# Patient Record
Sex: FEMALE | Race: WHITE | NOT HISPANIC OR LATINO
[De-identification: names, ages, dates, MRNs, and addresses within clinical notes are randomized per-mention and may not be internally consistent; named-entity substitution may affect disease eponyms.]

---

## 2017-04-28 ENCOUNTER — APPOINTMENT (OUTPATIENT)
Dept: MRI IMAGING | Facility: CLINIC | Age: 67
End: 2017-04-28

## 2017-04-28 ENCOUNTER — OUTPATIENT (OUTPATIENT)
Dept: OUTPATIENT SERVICES | Facility: HOSPITAL | Age: 67
LOS: 1 days | End: 2017-04-28
Payer: MEDICARE

## 2017-04-28 DIAGNOSIS — Z00.8 ENCOUNTER FOR OTHER GENERAL EXAMINATION: ICD-10-CM

## 2017-04-28 PROCEDURE — C8908: CPT

## 2017-04-28 PROCEDURE — C8937: CPT

## 2017-04-28 PROCEDURE — A9585: CPT

## 2017-10-30 ENCOUNTER — APPOINTMENT (OUTPATIENT)
Dept: MAMMOGRAPHY | Facility: CLINIC | Age: 67
End: 2017-10-30
Payer: MEDICARE

## 2017-10-30 ENCOUNTER — APPOINTMENT (OUTPATIENT)
Dept: ULTRASOUND IMAGING | Facility: CLINIC | Age: 67
End: 2017-10-30
Payer: MEDICARE

## 2017-10-30 ENCOUNTER — OUTPATIENT (OUTPATIENT)
Dept: OUTPATIENT SERVICES | Facility: HOSPITAL | Age: 67
LOS: 1 days | End: 2017-10-30
Payer: MEDICARE

## 2017-10-30 DIAGNOSIS — Z00.8 ENCOUNTER FOR OTHER GENERAL EXAMINATION: ICD-10-CM

## 2017-10-30 PROCEDURE — G0202: CPT | Mod: 26

## 2017-10-30 PROCEDURE — 77063 BREAST TOMOSYNTHESIS BI: CPT | Mod: 26

## 2017-10-30 PROCEDURE — 76641 ULTRASOUND BREAST COMPLETE: CPT | Mod: 26,50

## 2017-10-30 PROCEDURE — 76641 ULTRASOUND BREAST COMPLETE: CPT

## 2017-10-30 PROCEDURE — 77063 BREAST TOMOSYNTHESIS BI: CPT

## 2017-10-30 PROCEDURE — 77067 SCR MAMMO BI INCL CAD: CPT

## 2017-11-05 ENCOUNTER — TRANSCRIPTION ENCOUNTER (OUTPATIENT)
Age: 67
End: 2017-11-05

## 2017-11-08 ENCOUNTER — TRANSCRIPTION ENCOUNTER (OUTPATIENT)
Age: 67
End: 2017-11-08

## 2017-11-20 ENCOUNTER — APPOINTMENT (OUTPATIENT)
Dept: ORTHOPEDIC SURGERY | Facility: CLINIC | Age: 67
End: 2017-11-20
Payer: MEDICARE

## 2017-11-20 VITALS
DIASTOLIC BLOOD PRESSURE: 78 MMHG | HEIGHT: 65 IN | WEIGHT: 213 LBS | SYSTOLIC BLOOD PRESSURE: 130 MMHG | HEART RATE: 55 BPM | TEMPERATURE: 98.1 F | BODY MASS INDEX: 35.49 KG/M2

## 2017-11-20 DIAGNOSIS — Z78.9 OTHER SPECIFIED HEALTH STATUS: ICD-10-CM

## 2017-11-20 DIAGNOSIS — Z87.39 PERSONAL HISTORY OF OTHER DISEASES OF THE MUSCULOSKELETAL SYSTEM AND CONNECTIVE TISSUE: ICD-10-CM

## 2017-11-20 DIAGNOSIS — Z82.61 FAMILY HISTORY OF ARTHRITIS: ICD-10-CM

## 2017-11-20 PROCEDURE — 99204 OFFICE O/P NEW MOD 45 MIN: CPT

## 2017-11-20 RX ORDER — ECONAZOLE NITRATE 10 MG/G
1 CREAM TOPICAL
Qty: 85 | Refills: 0 | Status: ACTIVE | COMMUNITY
Start: 2017-04-19

## 2017-11-20 RX ORDER — METOPROLOL TARTRATE 100 MG/1
100 TABLET, FILM COATED ORAL
Qty: 180 | Refills: 0 | Status: ACTIVE | COMMUNITY
Start: 2017-04-27

## 2017-11-20 RX ORDER — DILTIAZEM HYDROCHLORIDE 120 MG/1
120 CAPSULE, EXTENDED RELEASE ORAL
Qty: 90 | Refills: 0 | Status: ACTIVE | COMMUNITY
Start: 2017-08-11

## 2017-11-20 RX ORDER — ROSUVASTATIN CALCIUM 5 MG/1
5 TABLET, FILM COATED ORAL
Qty: 45 | Refills: 0 | Status: ACTIVE | COMMUNITY
Start: 2017-03-20

## 2017-11-29 ENCOUNTER — TRANSCRIPTION ENCOUNTER (OUTPATIENT)
Age: 67
End: 2017-11-29

## 2018-05-21 ENCOUNTER — APPOINTMENT (OUTPATIENT)
Dept: MRI IMAGING | Facility: CLINIC | Age: 68
End: 2018-05-21
Payer: MEDICARE

## 2018-05-21 ENCOUNTER — OUTPATIENT (OUTPATIENT)
Dept: OUTPATIENT SERVICES | Facility: HOSPITAL | Age: 68
LOS: 1 days | End: 2018-05-21
Payer: MEDICARE

## 2018-05-21 DIAGNOSIS — Z00.8 ENCOUNTER FOR OTHER GENERAL EXAMINATION: ICD-10-CM

## 2018-05-21 PROCEDURE — 82565 ASSAY OF CREATININE: CPT

## 2018-05-21 PROCEDURE — C8937: CPT

## 2018-05-21 PROCEDURE — A9585: CPT

## 2018-05-21 PROCEDURE — C8908: CPT

## 2018-05-21 PROCEDURE — 77059 MRI BREAST BILATERAL: CPT | Mod: 26

## 2018-05-21 PROCEDURE — 0159T: CPT | Mod: 26

## 2018-06-11 ENCOUNTER — RESULT REVIEW (OUTPATIENT)
Age: 68
End: 2018-06-11

## 2018-10-15 ENCOUNTER — TRANSCRIPTION ENCOUNTER (OUTPATIENT)
Age: 68
End: 2018-10-15

## 2018-10-15 NOTE — ASU PATIENT PROFILE, ADULT - PMH
Atrial fibrillation    HLD (hyperlipidemia)    HTN (hypertension)    Hypothyroid    Prediabetes    Thyroid cancer

## 2018-10-16 ENCOUNTER — OUTPATIENT (OUTPATIENT)
Dept: OUTPATIENT SERVICES | Facility: HOSPITAL | Age: 68
LOS: 1 days | End: 2018-10-16
Payer: MEDICARE

## 2018-10-16 VITALS
TEMPERATURE: 98 F | SYSTOLIC BLOOD PRESSURE: 126 MMHG | HEART RATE: 53 BPM | OXYGEN SATURATION: 99 % | RESPIRATION RATE: 17 BRPM | DIASTOLIC BLOOD PRESSURE: 70 MMHG | HEIGHT: 65 IN | WEIGHT: 212.97 LBS

## 2018-10-16 VITALS
HEART RATE: 55 BPM | DIASTOLIC BLOOD PRESSURE: 80 MMHG | RESPIRATION RATE: 15 BRPM | OXYGEN SATURATION: 100 % | SYSTOLIC BLOOD PRESSURE: 135 MMHG

## 2018-10-16 DIAGNOSIS — H25.21 AGE-RELATED CATARACT, MORGAGNIAN TYPE, RIGHT EYE: ICD-10-CM

## 2018-10-16 DIAGNOSIS — Z98.890 OTHER SPECIFIED POSTPROCEDURAL STATES: Chronic | ICD-10-CM

## 2018-10-16 PROCEDURE — 66984 XCAPSL CTRC RMVL W/O ECP: CPT | Mod: RT

## 2018-10-16 PROCEDURE — V2632: CPT

## 2018-11-01 ENCOUNTER — APPOINTMENT (OUTPATIENT)
Dept: MAMMOGRAPHY | Facility: CLINIC | Age: 68
End: 2018-11-01
Payer: MEDICARE

## 2018-11-01 ENCOUNTER — APPOINTMENT (OUTPATIENT)
Dept: ULTRASOUND IMAGING | Facility: CLINIC | Age: 68
End: 2018-11-01
Payer: MEDICARE

## 2018-11-01 ENCOUNTER — OUTPATIENT (OUTPATIENT)
Dept: OUTPATIENT SERVICES | Facility: HOSPITAL | Age: 68
LOS: 1 days | End: 2018-11-01
Payer: MEDICARE

## 2018-11-01 DIAGNOSIS — Z12.31 ENCOUNTER FOR SCREENING MAMMOGRAM FOR MALIGNANT NEOPLASM OF BREAST: ICD-10-CM

## 2018-11-01 DIAGNOSIS — Z98.890 OTHER SPECIFIED POSTPROCEDURAL STATES: Chronic | ICD-10-CM

## 2018-11-01 PROBLEM — E03.9 HYPOTHYROIDISM, UNSPECIFIED: Chronic | Status: ACTIVE | Noted: 2018-10-16

## 2018-11-01 PROBLEM — C73 MALIGNANT NEOPLASM OF THYROID GLAND: Chronic | Status: ACTIVE | Noted: 2018-10-16

## 2018-11-01 PROBLEM — I48.91 UNSPECIFIED ATRIAL FIBRILLATION: Chronic | Status: ACTIVE | Noted: 2018-10-16

## 2018-11-01 PROBLEM — E78.5 HYPERLIPIDEMIA, UNSPECIFIED: Chronic | Status: ACTIVE | Noted: 2018-10-16

## 2018-11-01 PROBLEM — I10 ESSENTIAL (PRIMARY) HYPERTENSION: Chronic | Status: ACTIVE | Noted: 2018-10-16

## 2018-11-01 PROBLEM — R73.03 PREDIABETES: Chronic | Status: ACTIVE | Noted: 2018-10-16

## 2018-11-01 PROCEDURE — 77063 BREAST TOMOSYNTHESIS BI: CPT | Mod: 26

## 2018-11-01 PROCEDURE — 77063 BREAST TOMOSYNTHESIS BI: CPT

## 2018-11-01 PROCEDURE — 76641 ULTRASOUND BREAST COMPLETE: CPT | Mod: 26,50

## 2018-11-01 PROCEDURE — 77067 SCR MAMMO BI INCL CAD: CPT

## 2018-11-01 PROCEDURE — 77067 SCR MAMMO BI INCL CAD: CPT | Mod: 26

## 2018-11-01 PROCEDURE — 76641 ULTRASOUND BREAST COMPLETE: CPT

## 2018-11-05 ENCOUNTER — OUTPATIENT (OUTPATIENT)
Dept: OUTPATIENT SERVICES | Facility: HOSPITAL | Age: 68
LOS: 1 days | End: 2018-11-05
Payer: MEDICARE

## 2018-11-05 ENCOUNTER — APPOINTMENT (OUTPATIENT)
Dept: MAMMOGRAPHY | Facility: CLINIC | Age: 68
End: 2018-11-05
Payer: MEDICARE

## 2018-11-05 DIAGNOSIS — Z98.890 OTHER SPECIFIED POSTPROCEDURAL STATES: Chronic | ICD-10-CM

## 2018-11-05 DIAGNOSIS — Z91.89 OTHER SPECIFIED PERSONAL RISK FACTORS, NOT ELSEWHERE CLASSIFIED: ICD-10-CM

## 2018-11-05 PROCEDURE — 77065 DX MAMMO INCL CAD UNI: CPT | Mod: 26,RT

## 2018-11-05 PROCEDURE — 77065 DX MAMMO INCL CAD UNI: CPT

## 2018-11-05 PROCEDURE — G0279: CPT | Mod: 26

## 2018-11-05 PROCEDURE — G0279: CPT

## 2018-11-12 ENCOUNTER — TRANSCRIPTION ENCOUNTER (OUTPATIENT)
Age: 68
End: 2018-11-12

## 2018-11-13 ENCOUNTER — OUTPATIENT (OUTPATIENT)
Dept: OUTPATIENT SERVICES | Facility: HOSPITAL | Age: 68
LOS: 1 days | End: 2018-11-13
Payer: MEDICARE

## 2018-11-13 VITALS
DIASTOLIC BLOOD PRESSURE: 61 MMHG | SYSTOLIC BLOOD PRESSURE: 117 MMHG | TEMPERATURE: 98 F | WEIGHT: 211.2 LBS | OXYGEN SATURATION: 98 % | HEIGHT: 64 IN | HEART RATE: 53 BPM | RESPIRATION RATE: 9 BRPM

## 2018-11-13 VITALS
HEART RATE: 58 BPM | SYSTOLIC BLOOD PRESSURE: 117 MMHG | DIASTOLIC BLOOD PRESSURE: 69 MMHG | OXYGEN SATURATION: 99 % | RESPIRATION RATE: 15 BRPM

## 2018-11-13 DIAGNOSIS — H25.22 AGE-RELATED CATARACT, MORGAGNIAN TYPE, LEFT EYE: ICD-10-CM

## 2018-11-13 DIAGNOSIS — Z98.41 CATARACT EXTRACTION STATUS, RIGHT EYE: Chronic | ICD-10-CM

## 2018-11-13 DIAGNOSIS — Z98.890 OTHER SPECIFIED POSTPROCEDURAL STATES: Chronic | ICD-10-CM

## 2018-11-13 PROCEDURE — V2632: CPT

## 2018-11-13 PROCEDURE — 66984 XCAPSL CTRC RMVL W/O ECP: CPT | Mod: LT

## 2018-11-28 ENCOUNTER — RESULT REVIEW (OUTPATIENT)
Age: 68
End: 2018-11-28

## 2018-11-28 ENCOUNTER — APPOINTMENT (OUTPATIENT)
Dept: MAMMOGRAPHY | Facility: CLINIC | Age: 68
End: 2018-11-28
Payer: MEDICARE

## 2018-11-28 ENCOUNTER — OUTPATIENT (OUTPATIENT)
Dept: OUTPATIENT SERVICES | Facility: HOSPITAL | Age: 68
LOS: 1 days | End: 2018-11-28
Payer: MEDICARE

## 2018-11-28 DIAGNOSIS — Z98.41 CATARACT EXTRACTION STATUS, RIGHT EYE: Chronic | ICD-10-CM

## 2018-11-28 DIAGNOSIS — Z91.89 OTHER SPECIFIED PERSONAL RISK FACTORS, NOT ELSEWHERE CLASSIFIED: ICD-10-CM

## 2018-11-28 DIAGNOSIS — Z98.890 OTHER SPECIFIED POSTPROCEDURAL STATES: Chronic | ICD-10-CM

## 2018-11-28 PROCEDURE — A4648: CPT

## 2018-11-28 PROCEDURE — 77065 DX MAMMO INCL CAD UNI: CPT | Mod: 26,RT

## 2018-11-28 PROCEDURE — 19081 BX BREAST 1ST LESION STRTCTC: CPT | Mod: RT

## 2018-11-28 PROCEDURE — 77065 DX MAMMO INCL CAD UNI: CPT

## 2018-11-28 PROCEDURE — 19081 BX BREAST 1ST LESION STRTCTC: CPT

## 2019-10-03 ENCOUNTER — APPOINTMENT (OUTPATIENT)
Dept: ULTRASOUND IMAGING | Facility: CLINIC | Age: 69
End: 2019-10-03
Payer: MEDICARE

## 2019-10-03 ENCOUNTER — OUTPATIENT (OUTPATIENT)
Dept: OUTPATIENT SERVICES | Facility: HOSPITAL | Age: 69
LOS: 1 days | End: 2019-10-03
Payer: MEDICARE

## 2019-10-03 ENCOUNTER — APPOINTMENT (OUTPATIENT)
Dept: MAMMOGRAPHY | Facility: CLINIC | Age: 69
End: 2019-10-03
Payer: MEDICARE

## 2019-10-03 DIAGNOSIS — Z98.41 CATARACT EXTRACTION STATUS, RIGHT EYE: Chronic | ICD-10-CM

## 2019-10-03 DIAGNOSIS — Z98.890 OTHER SPECIFIED POSTPROCEDURAL STATES: Chronic | ICD-10-CM

## 2019-10-03 DIAGNOSIS — Z00.8 ENCOUNTER FOR OTHER GENERAL EXAMINATION: ICD-10-CM

## 2019-10-03 PROCEDURE — G0279: CPT | Mod: 26

## 2019-10-03 PROCEDURE — G0279: CPT

## 2019-10-03 PROCEDURE — 76641 ULTRASOUND BREAST COMPLETE: CPT | Mod: 26,50

## 2019-10-03 PROCEDURE — 77066 DX MAMMO INCL CAD BI: CPT | Mod: 26

## 2019-10-03 PROCEDURE — 76641 ULTRASOUND BREAST COMPLETE: CPT

## 2019-10-03 PROCEDURE — 77066 DX MAMMO INCL CAD BI: CPT

## 2020-04-02 ENCOUNTER — APPOINTMENT (OUTPATIENT)
Dept: MRI IMAGING | Facility: CLINIC | Age: 70
End: 2020-04-02

## 2020-08-26 ENCOUNTER — OUTPATIENT (OUTPATIENT)
Dept: OUTPATIENT SERVICES | Facility: HOSPITAL | Age: 70
LOS: 1 days | End: 2020-08-26
Payer: MEDICARE

## 2020-08-26 ENCOUNTER — APPOINTMENT (OUTPATIENT)
Dept: MRI IMAGING | Facility: CLINIC | Age: 70
End: 2020-08-26
Payer: MEDICARE

## 2020-08-26 DIAGNOSIS — D05.11 INTRADUCTAL CARCINOMA IN SITU OF RIGHT BREAST: ICD-10-CM

## 2020-08-26 DIAGNOSIS — Z98.41 CATARACT EXTRACTION STATUS, RIGHT EYE: Chronic | ICD-10-CM

## 2020-08-26 DIAGNOSIS — Z98.890 OTHER SPECIFIED POSTPROCEDURAL STATES: Chronic | ICD-10-CM

## 2020-08-26 PROCEDURE — A9585: CPT

## 2020-08-26 PROCEDURE — C8908: CPT

## 2020-08-26 PROCEDURE — C8937: CPT

## 2020-08-26 PROCEDURE — 77049 MRI BREAST C-+ W/CAD BI: CPT | Mod: 26

## 2020-10-05 ENCOUNTER — APPOINTMENT (OUTPATIENT)
Dept: MAMMOGRAPHY | Facility: CLINIC | Age: 70
End: 2020-10-05
Payer: MEDICARE

## 2020-10-05 ENCOUNTER — OUTPATIENT (OUTPATIENT)
Dept: OUTPATIENT SERVICES | Facility: HOSPITAL | Age: 70
LOS: 1 days | End: 2020-10-05
Payer: MEDICARE

## 2020-10-05 ENCOUNTER — APPOINTMENT (OUTPATIENT)
Dept: ULTRASOUND IMAGING | Facility: CLINIC | Age: 70
End: 2020-10-05
Payer: MEDICARE

## 2020-10-05 DIAGNOSIS — Z98.890 OTHER SPECIFIED POSTPROCEDURAL STATES: Chronic | ICD-10-CM

## 2020-10-05 DIAGNOSIS — Z00.8 ENCOUNTER FOR OTHER GENERAL EXAMINATION: ICD-10-CM

## 2020-10-05 DIAGNOSIS — D05.11 INTRADUCTAL CARCINOMA IN SITU OF RIGHT BREAST: ICD-10-CM

## 2020-10-05 DIAGNOSIS — Z98.41 CATARACT EXTRACTION STATUS, RIGHT EYE: Chronic | ICD-10-CM

## 2020-10-05 PROCEDURE — 76641 ULTRASOUND BREAST COMPLETE: CPT

## 2020-10-05 PROCEDURE — G0279: CPT | Mod: 26

## 2020-10-05 PROCEDURE — 76641 ULTRASOUND BREAST COMPLETE: CPT | Mod: 26,50

## 2020-10-05 PROCEDURE — G0279: CPT

## 2020-10-05 PROCEDURE — 77066 DX MAMMO INCL CAD BI: CPT | Mod: 26

## 2020-10-05 PROCEDURE — 77066 DX MAMMO INCL CAD BI: CPT

## 2021-04-14 ENCOUNTER — APPOINTMENT (OUTPATIENT)
Dept: MAMMOGRAPHY | Facility: CLINIC | Age: 71
End: 2021-04-14

## 2021-04-14 ENCOUNTER — APPOINTMENT (OUTPATIENT)
Dept: ULTRASOUND IMAGING | Facility: CLINIC | Age: 71
End: 2021-04-14

## 2021-05-11 DIAGNOSIS — Z92.3 PERSONAL HISTORY OF IRRADIATION: ICD-10-CM

## 2021-05-11 DIAGNOSIS — C73 MALIGNANT NEOPLASM OF THYROID GLAND: ICD-10-CM

## 2021-05-11 DIAGNOSIS — E78.5 HYPERLIPIDEMIA, UNSPECIFIED: ICD-10-CM

## 2021-05-11 DIAGNOSIS — S60.051A CONTUSION OF RIGHT LITTLE FINGER W/OUT DAMAGE TO NAIL, INITIAL ENCOUNTER: ICD-10-CM

## 2021-05-11 DIAGNOSIS — Z13.79 ENCOUNTER FOR OTHER SCREENING FOR GENETIC AND CHROMOSOMAL ANOMALIES: ICD-10-CM

## 2021-05-11 DIAGNOSIS — I10 ESSENTIAL (PRIMARY) HYPERTENSION: ICD-10-CM

## 2021-05-11 DIAGNOSIS — Z80.3 FAMILY HISTORY OF MALIGNANT NEOPLASM OF BREAST: ICD-10-CM

## 2021-05-20 ENCOUNTER — RESULT REVIEW (OUTPATIENT)
Age: 71
End: 2021-05-20

## 2021-05-26 ENCOUNTER — OUTPATIENT (OUTPATIENT)
Dept: OUTPATIENT SERVICES | Facility: HOSPITAL | Age: 71
LOS: 1 days | Discharge: HOME | End: 2021-05-26

## 2021-05-26 ENCOUNTER — APPOINTMENT (OUTPATIENT)
Dept: HEMATOLOGY ONCOLOGY | Facility: CLINIC | Age: 71
End: 2021-05-26
Payer: MEDICARE

## 2021-05-26 VITALS
DIASTOLIC BLOOD PRESSURE: 78 MMHG | SYSTOLIC BLOOD PRESSURE: 147 MMHG | WEIGHT: 210 LBS | TEMPERATURE: 98.7 F | HEART RATE: 67 BPM | BODY MASS INDEX: 34.95 KG/M2

## 2021-05-26 DIAGNOSIS — Z98.41 CATARACT EXTRACTION STATUS, RIGHT EYE: Chronic | ICD-10-CM

## 2021-05-26 DIAGNOSIS — Z98.890 OTHER SPECIFIED POSTPROCEDURAL STATES: Chronic | ICD-10-CM

## 2021-05-26 PROCEDURE — 99204 OFFICE O/P NEW MOD 45 MIN: CPT

## 2021-05-26 NOTE — HISTORY OF PRESENT ILLNESS
[Disease: _____________________] : Disease: [unfilled] [T: ___] : T[unfilled] [AJCC Stage: ____] : AJCC Stage: [unfilled] [de-identified] : 70 y/o woman with hx of DCIS \par Nov 2018 new  calcifications in R breast on screening mammogram. Bx : DCIS intermediate grade ER > 95% AL 80 % \par \par 1/24/19 right lumpectomy and b/l breast reduction ( Dr Elmore at [place]) : multifocal DCIS ER positive . Adjuvant RT ( [facility]). On anastrozole since  May  2019.\par Wants to transfer care here. \par \par feels well. \par Tolerating anastrozole OK.\par \par Yearly breast  exam with Dr Elmore ( ~ NOvember) Monitored with imaging q 6 months ( mammo/  MRI , now mammogram / sono q 6 months) - last imaging Mammogram April 2021 OK .\par \par GYN yearly - Dr Gallardo.\par Bone density 2020- told normal/ mild osteopenia. On vit D 5000 units daily  \par  [de-identified] : Genetic testing Radha 2019- panel negative

## 2021-05-26 NOTE — PHYSICAL EXAM
[Normal] : no JVD, no calf tenderness, venous stasis changes, varices [de-identified] : looks well  [de-identified] : s/p R breats lumpectomy and b/l breats reduction, no suspicious masses , no axillary adenopathy

## 2021-05-26 NOTE — REASON FOR VISIT
[Initial Consultation] : an initial consultation [FreeTextEntry2] : DCIS on Arimidex , transfer of care from [facility]

## 2021-05-26 NOTE — CONSULT LETTER
[Dear  ___] : Dear  [unfilled], [( Thank you for referring [unfilled] for consultation for _____ )] : Thank you for referring [unfilled] for consultation for [unfilled] [Please see my note below.] : Please see my note below. [Consult Closing:] : Thank you very much for allowing me to participate in the care of this patient.  If you have any questions, please do not hesitate to contact me. [Sincerely,] : Sincerely, [FreeTextEntry3] : Jil Mosqueda  [FreeTextEntry2] : Dr Flaco Elmore

## 2021-05-26 NOTE — ASSESSMENT
[FreeTextEntry1] : 72 y/o female wih hx of R breast  multifocal DCIS ER positive diagnosed in 2019 s/p R breast lumpectomy ( Dr Elmore) adjuvant RT ( [facility]) , on anastrozole since May 2019 . Tolerating well. SHIKHA clinically.\par \par Continue anastrozole x 5 years ( until May 2024)\par Breast exam and surveillance imaging per  breast surgery.\par Bone density in 2022 9 Dr Gallardo)\par Cont Vit D 5000 units daily . Follow up labs will be done by PCP .\par return visit in one year/ sooner PRN.\par \par D/w patient , answered multiple questions.

## 2021-06-03 DIAGNOSIS — C50.919 MALIGNANT NEOPLASM OF UNSPECIFIED SITE OF UNSPECIFIED FEMALE BREAST: ICD-10-CM

## 2021-06-08 DIAGNOSIS — Z86.000 PERSONAL HISTORY OF IN-SITU NEOPLASM OF BREAST: ICD-10-CM

## 2021-07-30 ENCOUNTER — TRANSCRIPTION ENCOUNTER (OUTPATIENT)
Age: 71
End: 2021-07-30

## 2022-05-08 ENCOUNTER — OUTPATIENT (OUTPATIENT)
Dept: OUTPATIENT SERVICES | Facility: HOSPITAL | Age: 72
LOS: 1 days | Discharge: HOME | End: 2022-05-08

## 2022-05-08 DIAGNOSIS — C50.919 MALIGNANT NEOPLASM OF UNSPECIFIED SITE OF UNSPECIFIED FEMALE BREAST: ICD-10-CM

## 2022-05-08 DIAGNOSIS — Z98.890 OTHER SPECIFIED POSTPROCEDURAL STATES: Chronic | ICD-10-CM

## 2022-05-08 DIAGNOSIS — Z98.41 CATARACT EXTRACTION STATUS, RIGHT EYE: Chronic | ICD-10-CM

## 2022-05-11 ENCOUNTER — APPOINTMENT (OUTPATIENT)
Dept: HEMATOLOGY ONCOLOGY | Facility: CLINIC | Age: 72
End: 2022-05-11
Payer: MEDICARE

## 2022-05-11 ENCOUNTER — APPOINTMENT (OUTPATIENT)
Dept: HEMATOLOGY ONCOLOGY | Facility: CLINIC | Age: 72
End: 2022-05-11

## 2022-05-11 VITALS
OXYGEN SATURATION: 96 % | BODY MASS INDEX: 34.28 KG/M2 | SYSTOLIC BLOOD PRESSURE: 134 MMHG | TEMPERATURE: 99 F | WEIGHT: 206 LBS | DIASTOLIC BLOOD PRESSURE: 80 MMHG | HEART RATE: 71 BPM | RESPIRATION RATE: 17 BRPM

## 2022-05-11 PROCEDURE — 99214 OFFICE O/P EST MOD 30 MIN: CPT

## 2022-06-20 NOTE — PHYSICAL EXAM
[Normal] : no JVD, no calf tenderness, venous stasis changes, varices [de-identified] : looks well  [de-identified] : s/p R breast  lumpectomy and b/l breast  reduction, no suspicious masses , no axillary adenopathy

## 2022-06-20 NOTE — REVIEW OF SYSTEMS
no dysuria, no frequency, and no hematuria. [Patient Intake Form Reviewed] : Patient intake form was reviewed [Negative] : Allergic/Immunologic [Joint Pain] : joint pain [Joint Stiffness] : joint stiffness [FreeTextEntry9] : chronic

## 2022-06-20 NOTE — HISTORY OF PRESENT ILLNESS
[Disease: _____________________] : Disease: [unfilled] [T: ___] : T[unfilled] [AJCC Stage: ____] : AJCC Stage: [unfilled] [de-identified] : 72 y/o woman with hx of DCIS \par Nov 2018 new  calcifications in R breast on screening mammogram. Bx : DCIS intermediate grade ER > 95% AR 80 % \par \par 1/24/19 right lumpectomy and b/l breast reduction ( Dr Elmore at [place]) : multifocal DCIS ER positive . Adjuvant RT ( [facility]). On anastrozole since  May  2019.\par Wants to transfer care here. \par \par feels well. \par Tolerating anastrozole OK.\par \par Yearly breast  exam with Dr Emlore ( ~ NOvember) Monitored with imaging q 6 months ( mammo/  MRI , now mammogram / sono q 6 months) - last imaging Mammogram April 2021 OK .\par \par GYN yearly - Dr Gallardo.\par Bone density 2020- told normal/ mild osteopenia. On vit D 5000 units daily  \par  [de-identified] : Genetic testing Radha 2019- panel negative  [de-identified] : Since her last visit no new issues.\par Follows with breast surgery Dr Nidia MELARA

## 2022-06-20 NOTE — ASSESSMENT
[FreeTextEntry1] : 71 y/o female wih hx of R breast  multifocal DCIS ER positive diagnosed in 2019 s/p R breast lumpectomy ( Dr Elmore) adjuvant RT ( [facility]) , on anastrozole since May 2019 . Tolerating well. SHIKHA clinically.\par \par Continue anastrozole x 5 years ( until May 2024)\par Breast exam and surveillance imaging per  breast surgery.\par Bone density in 2022 due ( Dr Gallardo)\par Cont Vit D 5000 units daily . Follow up labs will be done by PCP .\par return visit in one year/ sooner PRN.\par \par D/w patient.

## 2022-08-26 ENCOUNTER — NON-APPOINTMENT (OUTPATIENT)
Age: 72
End: 2022-08-26

## 2022-08-26 DIAGNOSIS — Z92.89 PERSONAL HISTORY OF OTHER MEDICAL TREATMENT: ICD-10-CM

## 2022-08-26 DIAGNOSIS — Z85.3 PERSONAL HISTORY OF MALIGNANT NEOPLASM OF BREAST: ICD-10-CM

## 2022-08-26 DIAGNOSIS — Z87.19 PERSONAL HISTORY OF OTHER DISEASES OF THE DIGESTIVE SYSTEM: ICD-10-CM

## 2022-08-26 DIAGNOSIS — Z98.890 OTHER SPECIFIED POSTPROCEDURAL STATES: ICD-10-CM

## 2022-08-26 DIAGNOSIS — Z78.9 OTHER SPECIFIED HEALTH STATUS: ICD-10-CM

## 2022-08-26 DIAGNOSIS — N81.2 INCOMPLETE UTEROVAGINAL PROLAPSE: ICD-10-CM

## 2022-09-16 ENCOUNTER — APPOINTMENT (OUTPATIENT)
Dept: OBGYN | Facility: CLINIC | Age: 72
End: 2022-09-16

## 2022-09-16 ENCOUNTER — RESULT CHARGE (OUTPATIENT)
Age: 72
End: 2022-09-16

## 2022-09-16 VITALS
SYSTOLIC BLOOD PRESSURE: 146 MMHG | BODY MASS INDEX: 33.32 KG/M2 | DIASTOLIC BLOOD PRESSURE: 82 MMHG | WEIGHT: 200 LBS | HEIGHT: 65 IN

## 2022-09-16 PROCEDURE — 81003 URINALYSIS AUTO W/O SCOPE: CPT | Mod: QW

## 2022-09-16 PROCEDURE — 82270 OCCULT BLOOD FECES: CPT

## 2022-09-16 PROCEDURE — G0101: CPT

## 2022-09-16 PROCEDURE — 85018 HEMOGLOBIN: CPT | Mod: QW

## 2022-09-16 NOTE — HISTORY OF PRESENT ILLNESS
[Patient reported colonoscopy was normal] : Patient reported colonoscopy was normal [TextBox_4] : Patient is a 73y/o female here today for annual visit. Hx of stage 3 uterovaginal prolapse. Stable at this time.   Patient hx of DCIS followed by breast surgeon at [facility]. On anastrazole. Patient has no complaints at this time.  [TextBox_19] : followed by breast surgeon [TextBox_37] : due now [ColonoscopyDate] : 11/18 [TextBox_43] : due 11/22

## 2022-09-16 NOTE — PLAN
[FreeTextEntry1] : Patient to follow up in 1 year for annual GYN exam\par Mammogram and bilateral breast US : Per breast surgeon\par Colonoscopy due: 11/22\par Bone density due: now Rx given\par Pap ordered\par Hemoccult ordered \par All questions answered, patient agreeable with plan.\par \par Written by Valorie GREEN, acting as a scribe for Dr. Gallardo. This note accurately reflects the work and decisions made by me.\par

## 2022-09-16 NOTE — PHYSICAL EXAM
[Chaperone Present] : A chaperone was present in the examining room during all aspects of the physical examination [Appropriately responsive] : appropriately responsive [No Lymphadenopathy] : no lymphadenopathy [Soft] : soft [Non-tender] : non-tender [Oriented x3] : oriented x3 [Examination Of The Breasts] : a normal appearance [No Discharge] : no discharge [No Masses] : no breast masses were palpable [Labia Majora] : normal [Labia Minora] : normal [Atrophy] : atrophy [Normal] : normal [Uterine Adnexae] : normal [FreeTextEntry1] : Valorie WOO-CHARLES chaperoned during entire physical exam [Occult Blood Positive] : was negative for occult blood analysis [FreeTextEntry4] : stage 3 uterovaginal prolapse  [FreeTextEntry5] : prolapse

## 2022-09-20 LAB
BILIRUB UR QL STRIP: NEGATIVE
GLUCOSE UR-MCNC: NEGATIVE
HCG UR QL: 0.2 EU/DL
HEMOGLOBIN: 14.3
HGB UR QL STRIP.AUTO: NORMAL
KETONES UR-MCNC: NEGATIVE
LEUKOCYTE ESTERASE UR QL STRIP: NORMAL
NITRITE UR QL STRIP: NEGATIVE
PH UR STRIP: 5.5
PROT UR STRIP-MCNC: NEGATIVE
SP GR UR STRIP: 1.02

## 2022-09-27 LAB — CYTOLOGY CVX/VAG DOC THIN PREP: NORMAL

## 2022-10-06 ENCOUNTER — OUTPATIENT (OUTPATIENT)
Dept: OUTPATIENT SERVICES | Facility: HOSPITAL | Age: 72
LOS: 1 days | End: 2022-10-06
Payer: MEDICARE

## 2022-10-06 ENCOUNTER — APPOINTMENT (OUTPATIENT)
Dept: RADIOLOGY | Facility: CLINIC | Age: 72
End: 2022-10-06

## 2022-10-06 DIAGNOSIS — Z98.890 OTHER SPECIFIED POSTPROCEDURAL STATES: Chronic | ICD-10-CM

## 2022-10-06 DIAGNOSIS — M85.80 OTHER SPECIFIED DISORDERS OF BONE DENSITY AND STRUCTURE, UNSPECIFIED SITE: ICD-10-CM

## 2022-10-06 DIAGNOSIS — Z98.41 CATARACT EXTRACTION STATUS, RIGHT EYE: Chronic | ICD-10-CM

## 2022-10-06 PROCEDURE — 77080 DXA BONE DENSITY AXIAL: CPT | Mod: 26

## 2022-10-06 PROCEDURE — 77080 DXA BONE DENSITY AXIAL: CPT

## 2022-10-21 ENCOUNTER — NON-APPOINTMENT (OUTPATIENT)
Age: 72
End: 2022-10-21

## 2023-01-16 NOTE — ASU PREOP CHECKLIST - HOW ADMINISTERED

## 2023-05-16 ENCOUNTER — TRANSCRIPTION ENCOUNTER (OUTPATIENT)
Age: 73
End: 2023-05-16

## 2023-05-16 ENCOUNTER — OUTPATIENT (OUTPATIENT)
Dept: OUTPATIENT SERVICES | Facility: HOSPITAL | Age: 73
LOS: 1 days | Discharge: HOME | End: 2023-05-16

## 2023-05-16 DIAGNOSIS — Z98.890 OTHER SPECIFIED POSTPROCEDURAL STATES: Chronic | ICD-10-CM

## 2023-05-16 DIAGNOSIS — C50.919 MALIGNANT NEOPLASM OF UNSPECIFIED SITE OF UNSPECIFIED FEMALE BREAST: ICD-10-CM

## 2023-05-16 DIAGNOSIS — Z98.41 CATARACT EXTRACTION STATUS, RIGHT EYE: Chronic | ICD-10-CM

## 2023-05-23 PROBLEM — Z86.79 HISTORY OF ATRIAL FIBRILLATION: Status: RESOLVED | Noted: 2021-05-11 | Resolved: 2023-05-23

## 2023-05-23 PROBLEM — Z86.79 HISTORY OF ATRIAL FIBRILLATION: Status: RESOLVED | Noted: 2022-08-26 | Resolved: 2023-05-23

## 2023-05-23 RX ORDER — LEVOTHYROXINE SODIUM 137 UG/1
TABLET ORAL
Refills: 0 | Status: DISCONTINUED | COMMUNITY
End: 2023-05-23

## 2023-05-23 RX ORDER — QUINAPRIL HYDROCHLORIDE 5 MG/1
TABLET, FILM COATED ORAL
Refills: 0 | Status: DISCONTINUED | COMMUNITY
End: 2023-05-23

## 2023-05-23 RX ORDER — ANASTROZOLE TABLETS 1 MG/1
1 TABLET ORAL
Refills: 0 | Status: DISCONTINUED | COMMUNITY
End: 2023-05-23

## 2023-05-23 RX ORDER — POLYETHYLENE GLYCOL 3350, SODIUM SULFATE, SODIUM CHLORIDE, POTASSIUM CHLORIDE, ASCORBIC ACID, SODIUM ASCORBATE 7.5-2.691G
100 KIT ORAL
Qty: 1 | Refills: 0 | Status: DISCONTINUED | COMMUNITY
Start: 2017-11-09 | End: 2023-05-23

## 2023-05-23 RX ORDER — METOPROLOL TARTRATE 75 MG/1
TABLET, FILM COATED ORAL
Refills: 0 | Status: DISCONTINUED | COMMUNITY
End: 2023-05-23

## 2023-05-24 ENCOUNTER — APPOINTMENT (OUTPATIENT)
Dept: HEMATOLOGY ONCOLOGY | Facility: CLINIC | Age: 73
End: 2023-05-24
Payer: MEDICARE

## 2023-05-24 DIAGNOSIS — E11.9 TYPE 2 DIABETES MELLITUS W/OUT COMPLICATIONS: ICD-10-CM

## 2023-05-24 DIAGNOSIS — Z86.000 PERSONAL HISTORY OF IN-SITU NEOPLASM OF BREAST: ICD-10-CM

## 2023-05-24 DIAGNOSIS — Z86.79 PERSONAL HISTORY OF OTHER DISEASES OF THE CIRCULATORY SYSTEM: ICD-10-CM

## 2023-05-24 DIAGNOSIS — M85.80 OTHER SPECIFIED DISORDERS OF BONE DENSITY AND STRUCTURE, UNSPECIFIED SITE: ICD-10-CM

## 2023-05-24 DIAGNOSIS — Z85.3 ENCOUNTER FOR FOLLOW-UP EXAMINATION AFTER COMPLETED TREATMENT FOR MALIGNANT NEOPLASM: ICD-10-CM

## 2023-05-24 DIAGNOSIS — Z08 ENCOUNTER FOR FOLLOW-UP EXAMINATION AFTER COMPLETED TREATMENT FOR MALIGNANT NEOPLASM: ICD-10-CM

## 2023-05-24 DIAGNOSIS — Z51.81 ENCOUNTER FOR THERAPEUTIC DRUG LVL MONITORING: ICD-10-CM

## 2023-05-24 DIAGNOSIS — Z79.899 ENCOUNTER FOR THERAPEUTIC DRUG LVL MONITORING: ICD-10-CM

## 2023-05-24 PROCEDURE — 99213 OFFICE O/P EST LOW 20 MIN: CPT

## 2023-05-24 RX ORDER — ASPIRIN 81 MG
81 TABLET, DELAYED RELEASE (ENTERIC COATED) ORAL
Refills: 0 | Status: ACTIVE | COMMUNITY

## 2023-05-24 RX ORDER — METFORMIN HYDROCHLORIDE 500 MG/1
500 TABLET, COATED ORAL
Refills: 0 | Status: ACTIVE | COMMUNITY

## 2023-05-24 RX ORDER — DILTIAZEM HYDROCHLORIDE 240 MG/1
240 CAPSULE, EXTENDED RELEASE ORAL
Refills: 0 | Status: DISCONTINUED | COMMUNITY
End: 2023-05-24

## 2023-05-24 RX ORDER — BACILLUS COAGULANS/INULIN 1B-250 MG
CAPSULE ORAL
Refills: 0 | Status: ACTIVE | COMMUNITY

## 2023-05-24 RX ORDER — LISINOPRIL 30 MG/1
TABLET ORAL
Refills: 0 | Status: ACTIVE | COMMUNITY

## 2023-05-24 RX ORDER — QUINAPRIL HYDROCHLORIDE 20 MG/1
20 TABLET, FILM COATED ORAL
Qty: 180 | Refills: 0 | Status: DISCONTINUED | COMMUNITY
Start: 2017-09-14 | End: 2023-05-24

## 2023-05-24 RX ORDER — ANASTROZOLE TABLETS 1 MG/1
1 TABLET ORAL DAILY
Qty: 90 | Refills: 3 | Status: ACTIVE | COMMUNITY
Start: 2021-05-26 | End: 1900-01-01

## 2023-05-24 RX ORDER — ROSUVASTATIN CALCIUM 5 MG/1
TABLET, FILM COATED ORAL
Refills: 0 | Status: DISCONTINUED | COMMUNITY
End: 2023-05-24

## 2023-05-24 RX ORDER — LEVOTHYROXINE SODIUM 0.1 MG/1
100 TABLET ORAL
Refills: 0 | Status: ACTIVE | COMMUNITY
Start: 2017-03-20

## 2023-05-24 NOTE — HISTORY OF PRESENT ILLNESS
[Disease: _____________________] : Disease: [unfilled] [T: ___] : T[unfilled] [AJCC Stage: ____] : AJCC Stage: [unfilled] [de-identified] : NINA SHARPE is a 73 y.o. F with a PMH significant for HTN, HLD, thyroid cancer s/p thyroidectomy and SINGLETON, and osteopenia, who we are following for a right sided DCIS. \par \par Presented with abnormality on routine mammogram\par 11/10/18 - Mammo - New grouping of calcifications in R central breast.  Sono was negative.  \par 11/28/18 - Right breast biopsy - DCIS, intermediate grade, ER > 95%, HI 80% \par \par 1/24/19 - Right lumpectomy and b/l breast reduction (Dr Elmore - [place]) - multifocal DCIS.  ER >90%,  HI 30%. \par 4/1019 - Completed  adjuvant RT 12 fractions ([facility]). \par 5/2019 - Started anastrozole.   \par \par 5/26/21 - Transferred her care to our office.  [de-identified] : DCIS [de-identified] : 12/20/18 - [facility] 29 gene panel - negative  [de-identified] : Patient reports tolerating Anastrozole OK.\ulises Has been started on Metformin. \par Quinapril was no longer available - has been changed to Lisinopril. \ulises Denies any other changes in her family, medical, or social history since her last visit of 5/11/22.

## 2023-05-24 NOTE — REVIEW OF SYSTEMS
[Patient Intake Form Reviewed] : Patient intake form was reviewed [Joint Pain] : joint pain [Joint Stiffness] : joint stiffness [Negative] : Allergic/Immunologic [FreeTextEntry4] : occasional bleeding gums [FreeTextEntry9] : chronic; especially ankles.  Also decreased ROM left shoulder from arhtritis

## 2023-05-24 NOTE — PHYSICAL EXAM
[de-identified] : s/p R breast  lumpectomy and b/l breast  reduction, no suspicious masses , no axillary adenopathy  [Normal] : affect appropriate [de-identified] : looks well  [de-identified] : anicteric [de-identified] : large ankles but no edema [de-identified] : joint changes c/w OA [de-identified] : no rashes

## 2023-05-24 NOTE — ASSESSMENT
[FreeTextEntry1] : Patient is a 73 y.o. F with a hx of an ER+, multifocal, right sided DCIS diagnosed in 2019 s/p R breast lumpectomy, adjuvant RT, and on anastrozole since 5/2019. \par Patient is tolerating anastrozole well. Clinically remains without evidence of disease.\par \par Plan: Continue anastrozole x 5 years (until May 2024). \par Continue with surveillance:\par \par Breast exam and surveillance imaging per breast surgery (Dr. Elmore). \par Yearly breast  exam with Dr Elmroe ( ~ November). Monitored with imaging q 6 months (mammo 2x year, sono yearly).  Used to have MRI but now can't lift left arm due to severe arthritis in left shoulder.  \par Last Mammo done ~ 1 week ago - was normal.  Due for Mammo/Sono every fall. \par GYN - Dr. Gallardo 9/16/22 - goes yearly - next 9/29/23. \par Bone density: 10/6/22 - Lowest T-score -2.1 in femoral neck. Was -2.3 on prior bone density 2019, albeit at a different radiology center - nonetheless essentially stable.   (Monitored by Dr. Gallardo). \par On Vit D 5000 units daily.   PCP monitors level. \par Colonoscopy - 6/11/18 and EGD ([facility]) - Erythema at GE junction and 8mm polyp in lower 1/3 of esophagus, else normal.  Mild diverticulosis of sigmoid colon.  Instructed to repeat colonoscopy in 5 years - due 6/2023.  \par Arimidex renewed. \par RTO 1 year - sooner PRN.\par \par PCP LUZ MARINA ROGERS \par Hansel Gallardo (GYN)\par Yvon Pino -> retired\par

## 2023-05-25 DIAGNOSIS — M85.80 OTHER SPECIFIED DISORDERS OF BONE DENSITY AND STRUCTURE, UNSPECIFIED SITE: ICD-10-CM

## 2023-05-25 DIAGNOSIS — Z08 ENCOUNTER FOR FOLLOW-UP EXAMINATION AFTER COMPLETED TREATMENT FOR MALIGNANT NEOPLASM: ICD-10-CM

## 2023-05-25 DIAGNOSIS — Z51.81 ENCOUNTER FOR THERAPEUTIC DRUG LEVEL MONITORING: ICD-10-CM

## 2023-05-25 DIAGNOSIS — Z86.000 PERSONAL HISTORY OF IN-SITU NEOPLASM OF BREAST: ICD-10-CM

## 2023-06-12 ENCOUNTER — NON-APPOINTMENT (OUTPATIENT)
Age: 73
End: 2023-06-12

## 2023-08-10 ENCOUNTER — APPOINTMENT (OUTPATIENT)
Dept: GASTROENTEROLOGY | Facility: CLINIC | Age: 73
End: 2023-08-10
Payer: MEDICARE

## 2023-08-10 VITALS
SYSTOLIC BLOOD PRESSURE: 140 MMHG | DIASTOLIC BLOOD PRESSURE: 90 MMHG | WEIGHT: 208 LBS | HEIGHT: 65 IN | BODY MASS INDEX: 34.66 KG/M2

## 2023-08-10 DIAGNOSIS — Z12.11 ENCOUNTER FOR SCREENING FOR MALIGNANT NEOPLASM OF COLON: ICD-10-CM

## 2023-08-10 DIAGNOSIS — Z86.010 PERSONAL HISTORY OF COLONIC POLYPS: ICD-10-CM

## 2023-08-10 PROCEDURE — 99203 OFFICE O/P NEW LOW 30 MIN: CPT

## 2023-08-10 RX ORDER — SODIUM SULFATE, POTASSIUM SULFATE AND MAGNESIUM SULFATE 1.6; 3.13; 17.5 G/177ML; G/177ML; G/177ML
17.5-3.13-1.6 SOLUTION ORAL
Qty: 1 | Refills: 0 | Status: ACTIVE | COMMUNITY
Start: 2023-08-10 | End: 1900-01-01

## 2023-08-10 NOTE — HISTORY OF PRESENT ILLNESS
[FreeTextEntry1] : 74yo female with hx colon polyps  Patient with hx colon polyps on prior colonoscopy and due for surveillance colonoscopy Patient is asymptomatic without bleeding or change in bowel habits

## 2023-08-10 NOTE — PHYSICAL EXAM
[Alert] : alert [Normal Voice/Communication] : normal voice/communication [Healthy Appearing] : healthy appearing [No Acute Distress] : no acute distress [Sclera] : the sclera and conjunctiva were normal [Hearing Threshold Finger Rub Not [place]] : hearing was normal [Normal Lips/Gums] : the lips and gums were normal [Oropharynx] : the oropharynx was normal [Normal Appearance] : the appearance of the neck was normal [No Neck Mass] : no neck mass was observed [No Respiratory Distress] : no respiratory distress [No Acc Muscle Use] : no accessory muscle use [Respiration, Rhythm And Depth] : normal respiratory rhythm and effort [Auscultation Breath Sounds / Voice Sounds] : lungs were clear to auscultation bilaterally [Heart Rate And Rhythm] : heart rate was normal and rhythm regular [Normal S1, S2] : normal S1 and S2 [Murmurs] : no murmurs [Bowel Sounds] : normal bowel sounds [Abdomen Tenderness] : non-tender [No Masses] : no abdominal mass palpated [Abdomen Soft] : soft [] : no hepatosplenomegaly [Oriented To Time, Place, And Person] : oriented to person, place, and time

## 2023-08-10 NOTE — ASSESSMENT
[FreeTextEntry1] : 72yo female with hx colon polyps  Will check colonoscopy with suprep Risks and benefits of procedure(s) discussed with patient in detail, including but not limited to, perforation, bleeding, reaction to anesthesia, missed lesions. H Plasty Text: Given the location of the defect, shape of the defect and the proximity to free margins a H-plasty was deemed most appropriate for repair.  Using a sterile surgical marker, the appropriate advancement arms of the H-plasty were drawn incorporating the defect and placing the expected incisions within the relaxed skin tension lines where possible. The area thus outlined was incised deep to adipose tissue with a #15 scalpel blade. The skin margins were undermined to an appropriate distance in all directions utilizing iris scissors.  The opposing advancement arms were then advanced into place in opposite direction and anchored with interrupted buried subcutaneous sutures.

## 2023-10-18 DIAGNOSIS — N95.2 POSTMENOPAUSAL ATROPHIC VAGINITIS: ICD-10-CM

## 2023-11-06 ENCOUNTER — APPOINTMENT (OUTPATIENT)
Dept: GASTROENTEROLOGY | Facility: AMBULATORY MEDICAL SERVICES | Age: 73
End: 2023-11-06
Payer: MEDICARE

## 2023-11-06 ENCOUNTER — RESULT REVIEW (OUTPATIENT)
Age: 73
End: 2023-11-06

## 2023-11-06 PROCEDURE — 45380 COLONOSCOPY AND BIOPSY: CPT | Mod: PT

## 2023-12-11 ENCOUNTER — RESULT CHARGE (OUTPATIENT)
Age: 73
End: 2023-12-11

## 2023-12-11 ENCOUNTER — APPOINTMENT (OUTPATIENT)
Dept: OBGYN | Facility: CLINIC | Age: 73
End: 2023-12-11
Payer: MEDICARE

## 2023-12-11 VITALS
DIASTOLIC BLOOD PRESSURE: 73 MMHG | HEART RATE: 80 BPM | SYSTOLIC BLOOD PRESSURE: 108 MMHG | HEIGHT: 64 IN | WEIGHT: 196 LBS | BODY MASS INDEX: 33.46 KG/M2

## 2023-12-11 DIAGNOSIS — Z00.00 ENCOUNTER FOR GENERAL ADULT MEDICAL EXAMINATION W/OUT ABNORMAL FINDINGS: ICD-10-CM

## 2023-12-11 LAB
BILIRUB UR QL STRIP: NORMAL
CLARITY UR: CLEAR
COLLECTION METHOD: NORMAL
GLUCOSE UR-MCNC: NORMAL
HCG UR QL: 0 EU/DL
HEMOGLOBIN: 15.5
HGB UR QL STRIP.AUTO: NORMAL
KETONES UR-MCNC: NORMAL
LEUKOCYTE ESTERASE UR QL STRIP: NORMAL
NITRITE UR QL STRIP: NORMAL
PH UR STRIP: 7
PROT UR STRIP-MCNC: NORMAL
SP GR UR STRIP: 1

## 2023-12-11 PROCEDURE — 81003 URINALYSIS AUTO W/O SCOPE: CPT | Mod: QW

## 2023-12-11 PROCEDURE — G0101: CPT

## 2023-12-14 ENCOUNTER — NON-APPOINTMENT (OUTPATIENT)
Age: 73
End: 2023-12-14

## 2023-12-15 ENCOUNTER — OFFICE (OUTPATIENT)
Dept: URBAN - METROPOLITAN AREA CLINIC 90 | Facility: CLINIC | Age: 73
Setting detail: OPHTHALMOLOGY
End: 2023-12-15
Payer: MEDICARE

## 2023-12-15 ENCOUNTER — NON-APPOINTMENT (OUTPATIENT)
Age: 73
End: 2023-12-15

## 2023-12-15 DIAGNOSIS — H57.02: ICD-10-CM

## 2023-12-15 DIAGNOSIS — H16.223: ICD-10-CM

## 2023-12-15 DIAGNOSIS — H11.442: ICD-10-CM

## 2023-12-15 DIAGNOSIS — H02.401: ICD-10-CM

## 2023-12-15 DIAGNOSIS — H35.371: ICD-10-CM

## 2023-12-15 DIAGNOSIS — H43.393: ICD-10-CM

## 2023-12-15 DIAGNOSIS — H26.493: ICD-10-CM

## 2023-12-15 DIAGNOSIS — H31.091: ICD-10-CM

## 2023-12-15 DIAGNOSIS — H18.529: ICD-10-CM

## 2023-12-15 DIAGNOSIS — Z84.89: ICD-10-CM

## 2023-12-15 DIAGNOSIS — D31.32: ICD-10-CM

## 2023-12-15 DIAGNOSIS — E11.9: ICD-10-CM

## 2023-12-15 LAB — CYTOLOGY CVX/VAG DOC THIN PREP: ABNORMAL

## 2023-12-15 PROCEDURE — 92014 COMPRE OPH EXAM EST PT 1/>: CPT | Performed by: OPHTHALMOLOGY

## 2023-12-15 PROCEDURE — 92134 CPTRZ OPH DX IMG PST SGM RTA: CPT | Performed by: OPHTHALMOLOGY

## 2023-12-15 ASSESSMENT — REFRACTION_AUTOREFRACTION
OS_CYLINDER: -1.00
OD_AXIS: 027
OS_SPHERE: -0.50
OD_CYLINDER: -0.25
OD_SPHERE: -0.50
OS_AXIS: 064

## 2023-12-15 ASSESSMENT — REFRACTION_CURRENTRX
OS_AXIS: 087
OD_OVR_VA: 20/
OD_VPRISM_DIRECTION: SV
OS_OVR_VA: 20/
OD_OVR_VA: 20/
OS_VPRISM_DIRECTION: SV
OS_SPHERE: -5.25
OS_ADD: +2.50
OD_CYLINDER: SPH
OS_OVR_VA: 20/
OD_SPHERE: PLANO
OS_OVR_VA: 20/
OS_VPRISM_DIRECTION: PROGS
OS_CYLINDER: -0.50
OS_CYLINDER: SPH
OD_OVR_VA: 20/
OD_CYLINDER: SPH
OD_VPRISM_DIRECTION: SV
OD_SPHERE: +2.50
OS_SPHERE: +2.50

## 2023-12-15 ASSESSMENT — REFRACTION_MANIFEST
OD_SPHERE: -5.50
OD_VA1: 20/20
OD_CYLINDER: -6.25
OS_CYLINDER: -1.25
OD_ADD: +2.50
OD_AXIS: 75
OD_VA2: 20/50
OU_VA: 20/30+1
OS_VA1: 20/30+-
OS_SPHERE: -6.00
OS_AXIS: 040
OS_ADD: +2.75
OD_CYLINDER: SPH
OD_SPHERE: -0.50
OS_VA1: 20/20
OD_ADD: +2.75
OS_VA2: 20/20
OD_SPHERE: -0.50
OD_VA2: 20/20
OD_VA1: 20/30-1
OS_SPHERE: -1.50
OS_ADD: +2.50
OS_CYLINDER: -0.50
OD_AXIS: 165
OS_VA2: 20/50
OS_AXIS: 050
OD_CYLINDER: -0.50
OD_VA1: 20/20

## 2023-12-15 ASSESSMENT — SPHEQUIV_DERIVED
OD_SPHEQUIV: -0.75
OS_SPHEQUIV: -6.625
OS_SPHEQUIV: -1.75
OS_SPHEQUIV: -1
OD_SPHEQUIV: -8.625
OD_SPHEQUIV: -0.625

## 2023-12-15 ASSESSMENT — LID EXAM ASSESSMENTS
OD_BLEPHARITIS: RUL 1+
OS_BLEPHARITIS: LUL 1+

## 2023-12-15 ASSESSMENT — CONFRONTATIONAL VISUAL FIELD TEST (CVF)
OD_FINDINGS: FULL
OS_FINDINGS: FULL

## 2023-12-15 ASSESSMENT — LID POSITION - PTOSIS: OD_PTOSIS: RUL T

## 2023-12-15 ASSESSMENT — SUPERFICIAL PUNCTATE KERATITIS (SPK)
OS_SPK: 2+
OD_SPK: 2+

## 2023-12-28 ENCOUNTER — APPOINTMENT (OUTPATIENT)
Dept: OBGYN | Facility: CLINIC | Age: 73
End: 2023-12-28

## 2024-01-02 ENCOUNTER — APPOINTMENT (OUTPATIENT)
Dept: OBGYN | Facility: CLINIC | Age: 74
End: 2024-01-02
Payer: MEDICARE

## 2024-01-02 DIAGNOSIS — Z01.419 ENCOUNTER FOR GYNECOLOGICAL EXAMINATION (GENERAL) (ROUTINE) W/OUT ABNORMAL FINDINGS: ICD-10-CM

## 2024-01-02 PROCEDURE — 99212 OFFICE O/P EST SF 10 MIN: CPT

## 2024-01-02 NOTE — HISTORY OF PRESENT ILLNESS
[FreeTextEntry1] : Patient is a 72 yo female here today for repeat pap. Initial pap smear unsatisfactory Hx of stage 3 uterovaginal prolapse.

## 2024-01-02 NOTE — PLAN
[FreeTextEntry1] : pap obtained RC Pt verbalized understanding  I Laura Odonnell [facility] am scribing for the presence of Dr. Gallardo the following sections HISTORY OF PRESENT ILLNESS, PAST MEDICAL/FAMILY/SOCIAL HISTORY; REVIEW OF SYSTEMS; VITAL SIGNS; PHYSICAL EXAM; DISPOSITION. I personally performed the services described in the documentation, reviewed the documentation recorded by the scribe in my presence and it accurately and completely records my words and actions.

## 2024-01-02 NOTE — PHYSICAL EXAM
[Chaperone Present] : A chaperone was present in the examining room during all aspects of the physical examination [Appropriately responsive] : appropriately responsive [Alert] : alert [No Acute Distress] : no acute distress [No Lymphadenopathy] : no lymphadenopathy [Oriented x3] : oriented x3 [Labia Majora] : normal [Labia Minora] : normal [Uterine Prolapse] : uterine prolapse [Normal] : normal [FreeTextEntry1] : Mariola FNP-BC [FreeTextEntry3] : stage 3 uterovaginal prolapse.

## 2024-01-04 LAB — CYTOLOGY CVX/VAG DOC THIN PREP: NORMAL

## 2024-02-28 ENCOUNTER — NON-APPOINTMENT (OUTPATIENT)
Age: 74
End: 2024-02-28

## 2024-07-28 ENCOUNTER — NON-APPOINTMENT (OUTPATIENT)
Age: 74
End: 2024-07-28

## 2024-11-27 ENCOUNTER — OFFICE (OUTPATIENT)
Age: 74
Setting detail: OPHTHALMOLOGY
End: 2024-11-27
Payer: MEDICARE

## 2024-11-27 DIAGNOSIS — H43.393: ICD-10-CM

## 2024-11-27 DIAGNOSIS — H18.529: ICD-10-CM

## 2024-11-27 DIAGNOSIS — H11.442: ICD-10-CM

## 2024-11-27 DIAGNOSIS — E11.9: ICD-10-CM

## 2024-11-27 DIAGNOSIS — Z84.89: ICD-10-CM

## 2024-11-27 DIAGNOSIS — D31.32: ICD-10-CM

## 2024-11-27 DIAGNOSIS — H35.371: ICD-10-CM

## 2024-11-27 DIAGNOSIS — H57.02: ICD-10-CM

## 2024-11-27 DIAGNOSIS — H26.493: ICD-10-CM

## 2024-11-27 DIAGNOSIS — H16.223: ICD-10-CM

## 2024-11-27 DIAGNOSIS — H02.401: ICD-10-CM

## 2024-11-27 DIAGNOSIS — Z83.511: ICD-10-CM

## 2024-11-27 PROCEDURE — 92014 COMPRE OPH EXAM EST PT 1/>: CPT | Performed by: OPHTHALMOLOGY

## 2024-11-27 PROCEDURE — 92250 FUNDUS PHOTOGRAPHY W/I&R: CPT | Performed by: OPHTHALMOLOGY

## 2024-11-27 ASSESSMENT — REFRACTION_MANIFEST
OS_AXIS: 040
OS_VA1: 20/20
OD_SPHERE: -0.50
OD_CYLINDER: SPH
OD_VA1: 20/20
OD_CYLINDER: -6.25
OD_VA1: 20/20
OD_AXIS: 75
OD_VA1: 20/30-1
OD_ADD: +2.50
OS_VA2: 20/50
OS_CYLINDER: -1.25
OS_ADD: +2.50
OD_ADD: +2.75
OD_VA2: 20/20
OS_VA2: 20/20
OD_VA2: 20/50
OS_AXIS: 050
OD_AXIS: 165
OS_SPHERE: -1.50
OS_ADD: +2.75
OS_SPHERE: -6.00
OS_CYLINDER: -0.50
OD_SPHERE: -0.50
OU_VA: 20/30+1
OD_SPHERE: -5.50
OD_CYLINDER: -0.50
OS_VA1: 20/30+-

## 2024-11-27 ASSESSMENT — KERATOMETRY
OD_K1POWER_DIOPTERS: 42.25
OS_AXISANGLE_DEGREES: 135
OS_K2POWER_DIOPTERS: 43.50
OD_AXISANGLE_DEGREES: 093
OS_K1POWER_DIOPTERS: 42.75
OD_K2POWER_DIOPTERS: 42.75
METHOD_AUTO_MANUAL: AUTO

## 2024-11-27 ASSESSMENT — REFRACTION_CURRENTRX
OD_SPHERE: +2.50
OS_OVR_VA: 20/
OD_OVR_VA: 20/
OD_CYLINDER: SPH
OD_VPRISM_DIRECTION: SV
OS_OVR_VA: 20/
OD_CYLINDER: SPH
OD_VPRISM_DIRECTION: SV
OS_CYLINDER: SPH
OS_ADD: +2.50
OS_VPRISM_DIRECTION: PROGS
OS_CYLINDER: -0.50
OS_AXIS: 087
OD_OVR_VA: 20/
OS_SPHERE: +2.50
OS_VPRISM_DIRECTION: SV
OS_OVR_VA: 20/
OD_SPHERE: PLANO
OS_SPHERE: -5.25
OD_OVR_VA: 20/

## 2024-11-27 ASSESSMENT — LID EXAM ASSESSMENTS
OS_BLEPHARITIS: LUL 1+
OD_BLEPHARITIS: RUL 1+

## 2024-11-27 ASSESSMENT — SUPERFICIAL PUNCTATE KERATITIS (SPK)
OS_SPK: 2+
OD_SPK: 2+

## 2024-11-27 ASSESSMENT — CONFRONTATIONAL VISUAL FIELD TEST (CVF)
OS_FINDINGS: FULL
OD_FINDINGS: FULL

## 2024-11-27 ASSESSMENT — REFRACTION_AUTOREFRACTION
OD_SPHERE: -0.25
OS_AXIS: 054
OS_SPHERE: -0.50
OD_CYLINDER: -0.25
OS_CYLINDER: -1.00
OD_AXIS: 033

## 2024-11-27 ASSESSMENT — LID POSITION - PTOSIS: OD_PTOSIS: RUL T

## 2024-11-27 ASSESSMENT — TONOMETRY
OD_IOP_MMHG: 16
OS_IOP_MMHG: 16

## 2024-11-27 ASSESSMENT — VISUAL ACUITY
OD_BCVA: 20/40-2
OS_BCVA: 20/20

## 2025-01-09 ENCOUNTER — OUTPATIENT (OUTPATIENT)
Dept: OUTPATIENT SERVICES | Facility: HOSPITAL | Age: 75
LOS: 1 days | End: 2025-01-09
Payer: MEDICARE

## 2025-01-09 ENCOUNTER — APPOINTMENT (OUTPATIENT)
Dept: RADIOLOGY | Facility: CLINIC | Age: 75
End: 2025-01-09
Payer: MEDICARE

## 2025-01-09 DIAGNOSIS — Z98.890 OTHER SPECIFIED POSTPROCEDURAL STATES: Chronic | ICD-10-CM

## 2025-01-09 DIAGNOSIS — Z00.00 ENCOUNTER FOR GENERAL ADULT MEDICAL EXAMINATION WITHOUT ABNORMAL FINDINGS: ICD-10-CM

## 2025-01-09 DIAGNOSIS — Z98.41 CATARACT EXTRACTION STATUS, RIGHT EYE: Chronic | ICD-10-CM

## 2025-01-09 PROCEDURE — 77080 DXA BONE DENSITY AXIAL: CPT | Mod: 26

## 2025-01-09 PROCEDURE — 77080 DXA BONE DENSITY AXIAL: CPT

## 2025-01-10 PROBLEM — Z12.11 COLON CANCER SCREENING: Status: ACTIVE | Noted: 2025-01-10

## 2025-01-15 ENCOUNTER — APPOINTMENT (OUTPATIENT)
Dept: OBGYN | Facility: CLINIC | Age: 75
End: 2025-01-15
Payer: MEDICARE

## 2025-01-15 VITALS
DIASTOLIC BLOOD PRESSURE: 78 MMHG | SYSTOLIC BLOOD PRESSURE: 116 MMHG | BODY MASS INDEX: 31.92 KG/M2 | HEIGHT: 64 IN | WEIGHT: 187 LBS | HEART RATE: 59 BPM

## 2025-01-15 DIAGNOSIS — N81.4 UTEROVAGINAL PROLAPSE, UNSPECIFIED: ICD-10-CM

## 2025-01-15 DIAGNOSIS — Z12.4 ENCOUNTER FOR SCREENING FOR MALIGNANT NEOPLASM OF CERVIX: ICD-10-CM

## 2025-01-15 DIAGNOSIS — Z12.11 ENCOUNTER FOR SCREENING FOR MALIGNANT NEOPLASM OF COLON: ICD-10-CM

## 2025-01-15 DIAGNOSIS — Z01.419 ENCOUNTER FOR GYNECOLOGICAL EXAMINATION (GENERAL) (ROUTINE) W/OUT ABNORMAL FINDINGS: ICD-10-CM

## 2025-01-15 DIAGNOSIS — M85.80 OTHER SPECIFIED DISORDERS OF BONE DENSITY AND STRUCTURE, UNSPECIFIED SITE: ICD-10-CM

## 2025-01-15 LAB
DATE COLLECTED: NORMAL
HEMOCCULT SP1 STL QL: NEGATIVE
HEMOGLOBIN: 14.7
QUALITY CONTROL: YES

## 2025-01-15 PROCEDURE — 82270 OCCULT BLOOD FECES: CPT

## 2025-01-15 PROCEDURE — 85018 HEMOGLOBIN: CPT | Mod: QW

## 2025-01-15 PROCEDURE — G0101: CPT

## 2025-01-20 LAB — CYTOLOGY CVX/VAG DOC THIN PREP: NORMAL
